# Patient Record
Sex: MALE | Race: BLACK OR AFRICAN AMERICAN | NOT HISPANIC OR LATINO | Employment: OTHER | URBAN - METROPOLITAN AREA
[De-identification: names, ages, dates, MRNs, and addresses within clinical notes are randomized per-mention and may not be internally consistent; named-entity substitution may affect disease eponyms.]

---

## 2023-04-14 ENCOUNTER — HOSPITAL ENCOUNTER (OUTPATIENT)
Dept: LAB | Facility: MEDICAL CENTER | Age: 73
End: 2023-04-14
Attending: UROLOGY
Payer: COMMERCIAL

## 2023-04-14 LAB — PSA SERPL-MCNC: 7.46 NG/ML (ref 0–4)

## 2023-04-14 PROCEDURE — 84153 ASSAY OF PSA TOTAL: CPT

## 2023-04-14 PROCEDURE — 36415 COLL VENOUS BLD VENIPUNCTURE: CPT

## 2025-02-21 ENCOUNTER — HOSPITAL ENCOUNTER (OUTPATIENT)
Dept: RADIOLOGY | Facility: MEDICAL CENTER | Age: 75
End: 2025-02-21
Attending: NURSE PRACTITIONER
Payer: COMMERCIAL

## 2025-02-21 DIAGNOSIS — E11.8 TYPE 2 DIABETES MELLITUS WITH UNSPECIFIED COMPLICATIONS (HCC): ICD-10-CM

## 2025-02-21 DIAGNOSIS — I10 ESSENTIAL (PRIMARY) HYPERTENSION: ICD-10-CM

## 2025-02-21 DIAGNOSIS — Z85.528 PERSONAL HISTORY OF OTHER MALIGNANT NEOPLASM OF KIDNEY: ICD-10-CM

## 2025-02-21 DIAGNOSIS — R97.20 ELEVATED PROSTATE SPECIFIC ANTIGEN (PSA): ICD-10-CM

## 2025-02-21 DIAGNOSIS — N18.4 CHRONIC KIDNEY DISEASE, STAGE 4 (SEVERE) (HCC): ICD-10-CM

## 2025-02-21 DIAGNOSIS — H54.10 BLINDNESS, ONE EYE, LOW VISION OTHER EYE, UNSPECIFIED EYES: ICD-10-CM

## 2025-02-21 DIAGNOSIS — I82.5Z2: ICD-10-CM

## 2025-02-21 DIAGNOSIS — R73.03 PREDIABETES: ICD-10-CM

## 2025-02-21 PROCEDURE — 700111 HCHG RX REV CODE 636 W/ 250 OVERRIDE (IP): Mod: JZ,TB | Performed by: RADIOLOGY

## 2025-02-21 PROCEDURE — 72195 MRI PELVIS W/O DYE: CPT

## 2025-02-21 RX ADMIN — GLUCAGON 1 MG: 1 INJECTION, POWDER, LYOPHILIZED, FOR SOLUTION INTRAMUSCULAR; INTRAVENOUS at 14:25

## 2025-04-14 ENCOUNTER — HOSPITAL ENCOUNTER (OUTPATIENT)
Dept: RADIOLOGY | Facility: MEDICAL CENTER | Age: 75
End: 2025-04-14
Attending: STUDENT IN AN ORGANIZED HEALTH CARE EDUCATION/TRAINING PROGRAM
Payer: COMMERCIAL

## 2025-04-14 DIAGNOSIS — Z85.528 PERSONAL HISTORY OF RENAL CANCER: ICD-10-CM

## 2025-04-14 LAB
GLUCOSE BLD-MCNC: 78 MG/DL (ref 65–99)
GLUCOSE BLD-MCNC: 85 MG/DL (ref 65–99)

## 2025-04-14 PROCEDURE — A9552 F18 FDG: HCPCS

## 2025-05-05 ENCOUNTER — PATIENT OUTREACH (OUTPATIENT)
Dept: ONCOLOGY | Facility: MEDICAL CENTER | Age: 75
End: 2025-05-05
Payer: COMMERCIAL

## 2025-05-08 ENCOUNTER — HOSPITAL ENCOUNTER (OUTPATIENT)
Dept: RADIOLOGY | Facility: MEDICAL CENTER | Age: 75
End: 2025-05-08
Attending: NURSE PRACTITIONER
Payer: COMMERCIAL

## 2025-05-08 ENCOUNTER — HOSPITAL ENCOUNTER (OUTPATIENT)
Dept: RADIOLOGY | Facility: MEDICAL CENTER | Age: 75
End: 2025-05-08
Payer: COMMERCIAL

## 2025-05-08 ENCOUNTER — PATIENT OUTREACH (OUTPATIENT)
Dept: ONCOLOGY | Facility: MEDICAL CENTER | Age: 75
End: 2025-05-08
Payer: COMMERCIAL

## 2025-05-08 NOTE — PROGRESS NOTES
First call placed to Sachin to introduce self and assess. No answer at time of call, LVM with reason for call and call back number. Sachin was referred to Dr. Altamirano by Dr. Gtz for prostate cancer.

## 2025-05-12 PROBLEM — C61 PROSTATE CANCER (HCC): Status: ACTIVE | Noted: 2025-05-12

## 2025-05-13 ENCOUNTER — HOSPITAL ENCOUNTER (OUTPATIENT)
Dept: RADIATION ONCOLOGY | Facility: MEDICAL CENTER | Age: 75
End: 2025-05-13
Attending: RADIOLOGY
Payer: COMMERCIAL

## 2025-05-13 VITALS
BODY MASS INDEX: 23.98 KG/M2 | WEIGHT: 177.03 LBS | HEIGHT: 72 IN | TEMPERATURE: 97.9 F | DIASTOLIC BLOOD PRESSURE: 83 MMHG | RESPIRATION RATE: 14 BRPM | SYSTOLIC BLOOD PRESSURE: 139 MMHG | HEART RATE: 62 BPM

## 2025-05-13 DIAGNOSIS — C61 PROSTATE CANCER (HCC): ICD-10-CM

## 2025-05-13 PROCEDURE — 99205 OFFICE O/P NEW HI 60 MIN: CPT | Performed by: RADIOLOGY

## 2025-05-13 PROCEDURE — 99214 OFFICE O/P EST MOD 30 MIN: CPT | Performed by: RADIOLOGY

## 2025-05-13 RX ORDER — CIPROFLOXACIN 500 MG/1
500 TABLET, FILM COATED ORAL 2 TIMES DAILY
COMMUNITY

## 2025-05-13 RX ORDER — VITAMIN B COMPLEX
1000 TABLET ORAL DAILY
COMMUNITY

## 2025-05-13 RX ORDER — AMLODIPINE BESYLATE 10 MG/1
10 TABLET ORAL DAILY
COMMUNITY

## 2025-05-13 RX ORDER — CETIRIZINE HYDROCHLORIDE 10 MG/1
10 TABLET ORAL DAILY
COMMUNITY

## 2025-05-13 ASSESSMENT — PAIN SCALES - GENERAL: PAINLEVEL_OUTOF10: 1=MINIMAL PAIN

## 2025-05-13 NOTE — PROGRESS NOTES
Patient was seen today in clinic with Dr. North for consult.  Vitals signs and weight were obtained and pain assessment was completed.  Allergies and medications were reviewed with the patient.       Vitals/Pain:  Vitals:    05/13/25 0850   BP: 139/83   BP Location: Right arm   Patient Position: Sitting   Pulse: 62   Resp: 14   Temp: 36.6 °C (97.9 °F)   Weight: 80.3 kg (177 lb 0.5 oz)   Height: 1.829 m (6')   Pain Score: 1=Minimal Pain        Allergies:   Patient has no known allergies.    Current Medications:  Current Outpatient Medications   Medication Sig Dispense Refill    amLODIPine (NORVASC) 10 MG Tab Take 10 mg by mouth every day.      vitamin D3 (CHOLECALCIFEROL) 1000 Unit (25 mcg) Tab Take 1,000 Units by mouth every day.      cetirizine (ZYRTEC) 10 MG Tab Take 10 mg by mouth every day.      ciprofloxacin (CIPRO) 500 MG Tab Take 500 mg by mouth 2 times a day.       No current facility-administered medications for this encounter.         PCP:  Pcp Pt States None        Tracie Lopez R.N.

## 2025-05-13 NOTE — CONSULTS
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      Bilateral renal cell carcinoma status post left nephrectomy in 2014 right partial nephrectomy in 2015, new right kidney mass by MRI and PET    HISTORY OF PRESENT ILLNESS: I had the pleasure of seeing Mr. Thayer today in consultation at the request of Dr. Gzt for his prostate cancer.  Patient is a 75-year-old gentleman whose history of genitourinary cancers dates back to 2014.  At that time he was diagnosed with a renal cell carcinoma and underwent a left-sided nephrectomy.  In 2015 he was identified to have a right sided renal cell carcinoma.  He underwent a right partial nephrectomy.  He has remained disease-free since that time until on surveillance this year MRI detected a new right kidney mass.  An FDG PET scan showed mild avidity in this area but could also be consistent with the collecting system.  There was also mild uptake in a 7 mm left upper lobe lung nodule of unknown certain significance.  In the interim he was also found to have an elevated PSA at 11.3.  He did not have any clinical nodularity.  Biopsy showed a [place] 4+4 = 8 disease in the left apex and Rani 4+3 = 7 disease in the left mid and left base.  He is uncertain how he wishes to proceed with his renal cell evaluation.  With his current comorbidities he is unwilling to undergo dialysis if this was required surgically.  He presents today to further discuss his options regarding his prostate gland.    PAST MEDICAL HISTORY:   Past Medical History:   Diagnosis Date    Blind     Blindness     Left eye, low vision right eye    Chronic kidney disease (CKD), stage IV (severe) (HCC)     DVT (deep venous thrombosis) (HCC)     RLE 2020    Elevated PSA     Esophagitis     Glaucoma     HTN (hypertension)     Hypertension     Renal cell carcinoma (HCC)     Right  and Left partial nephrectomy 2014, 2015    Type 2 diabetes mellitus (HCC)     Controlled by diet       PAST SURGICAL HISTORY:  Past Surgical History:   Procedure Laterality Date    CATARACT EXTRACTION WITH IOL Right     NEPHRECTOMY PARTIAL Left     12/2014    NEPHRECTOMY PARTIAL Right     01/2015    US TRANSRECTAL      TRUS Prostate Biopsy       CURRENT MEDICATIONS:  Current Outpatient Medications   Medication Sig Dispense Refill    amLODIPine (NORVASC) 10 MG Tab Take 10 mg by mouth every day.      vitamin D3 (CHOLECALCIFEROL) 1000 Unit (25 mcg) Tab Take 1,000 Units by mouth every day.      cetirizine (ZYRTEC) 10 MG Tab Take 10 mg by mouth every day.      ciprofloxacin (CIPRO) 500 MG Tab Take 500 mg by mouth 2 times a day.       No current facility-administered medications for this encounter.       ALLERGIES:    Patient has no known allergies.    FAMILY HISTORY:    Family History   Problem Relation Age of Onset    Cancer Father         prostate    Cancer Brother         prostate    Cancer Brother         prostate       SOCIAL HISTORY:    Social History     Tobacco Use    Smoking status: Never    Smokeless tobacco: Never   Vaping Use    Vaping status: Never Used   Substance Use Topics    Alcohol use: Not Currently     Comment: quit 1984    Drug use: Never       Patient is retired from [employer]  Lives with: Roommates, patient is legally blind    REVIEW OF SYSTEMS:  A complete review of systems was completed in patient's chart on 5/13/2025.  All are negative with relationship to this diagnosis with the exception of:  Patient is legally blind but does have right assistance, as a result of this and his comorbidities he is questioning how aggressive he wishes to be with any of his current malignant potential    PHYSICAL EXAM:    Vitals:    05/13/25 0850   BP: 139/83   BP Location: Right arm   Patient Position: Sitting   Pulse: 62   Resp: 14   Temp: 36.6 °C (97.9 °F)   Weight: 80.3 kg (177 lb 0.5 oz)   Height: 1.829  m (6')   Pain Score: 1=Minimal Pain      2= Ambulatory and capable of all self care, but unable to carry out any work activities.  Up and about more than 50% of waking hours.    PAIN:  0    GENERAL: No apparent distress.  HEENT:  Pupils are equal, round, and reactive to light.  Extraocular muscles   are intact. Sclerae nonicteric.  Conjunctivae pink.  Oral cavity, tongue   protrudes midline.   NECK:  Supple without evidence of thyromegaly.  NODES:  No peripheral adenopathy of the neck, supraclavicular fossa or axillae   bilaterally.  LUNGS:  Clear to ascultation bilaterally   HEART:  Regular rate and rhythm.  No murmur appreciated  ABDOMEN:  Soft. No evidence of hepatosplenomegaly.  Positive bowel sounds.  EXTREMITIES:  Without Edema.  NEUROLOGIC:  Cranial nerves II through XII were intact. Normal stance and gait motor and sensory grossly within normal limits       IPSS:  IN THE PAST MONTH:         TOTAL: 15 8-19 = Moderate    QUALITY OF LIFE DUE TO URINARY SYMPTOMS:     If you were to spend the rest of your life with your urinary condition just the way it is now, how would you feel about that? 2  = Mostly satisfied      DEVAN:  SEXUAL HEALTH INVENTORY FOR MEN (DEVAN):   Over the past 6 months:   1.  How do you rate your confidence that you could get and keep an erection?  1 = Very low    2.  When you had erections with sexual stimulation, how often were your erections hard enough for penetration (entering your partner)? 0 = No sexual activitity     3.  During sexual intercourse, how often were you able to maintain your erection after you had penetrated (entered) your partner? 0 = Did not attempt    4.  During sexual intercourse, how difficult was it to maintain your erection to completion of intercourse? 0= Did not attempt intercourse    5.  When you attempted sexual intercourse, how often was it satisfactory for you? 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      Bilateral renal cell carcinoma status post left nephrectomy in 2014 right partial nephrectomy in 2015, new right kidney mass by MRI and PET      RECOMMENDATIONS:   I discussed the diagnosis, prognosis, and treatment options over a 1 hr 5 min time period, 95% of that time dedicated to ongoing treatment management.  I first discussed with the patient his life goals.  If there are reasonable ways to maintain quality of life without dialysis he is willing to consider treatment options.  Therefore we discussed possibly a biopsy of the renal lesion on the right.  There are medical management for recurrent renal cell carcinoma.  This would likely minimize his risk of dialysis requirement.  As it relates to prostate cancer we discussed the high risk nature of his disease.  Therefore we discussed either management with androgen deprivation therapy alone or in combination with radiation.  For high risk disease even if this was his only site of disease we would start with neoadjuvant androgen deprivation therapy.  In his case this should allow us time to assess his renal cell situation either by biopsy or short interval radiographic follow-up.  Therefore I have suggested he start on androgen deprivation therapy at the [facility].  He and I will then revisit in 3 months to assess his progress on both fronts.  If he chooses to pursue radiation he felt 20 fraction hypofractionated radiation would be his choice as opposed to stereotactic radiosurgery.    We discussed the risks, benefits and side effects of treatment and the patient is amenable to treatment.  If patient has any questions or concerns, he should feel free to contact me.    Thank you for the opportunity to participate in his care.  If any questions or  comments, please do not hesitate in calling.

## 2025-05-15 ENCOUNTER — PATIENT OUTREACH (OUTPATIENT)
Dept: ONCOLOGY | Facility: MEDICAL CENTER | Age: 75
End: 2025-05-15
Payer: COMMERCIAL

## 2025-05-15 NOTE — PROGRESS NOTES
2nd call placed to Sachin today. No answer at time of call, [facility]. Sachin had consultation with Dr. Altamirano on 5/13 for prostate cancer. Sachin sent back to [facility] Dr. Gtz to start hormone therapy and then follow up visit scheduled with Dr. Altamirano 8/14/25.

## 2025-08-08 ENCOUNTER — PATIENT OUTREACH (OUTPATIENT)
Dept: ONCOLOGY | Facility: MEDICAL CENTER | Age: 75
End: 2025-08-08
Payer: COMMERCIAL

## 2025-08-14 ENCOUNTER — HOSPITAL ENCOUNTER (OUTPATIENT)
Dept: RADIATION ONCOLOGY | Facility: MEDICAL CENTER | Age: 75
End: 2025-08-14
Attending: RADIOLOGY
Payer: COMMERCIAL

## 2025-08-14 VITALS
DIASTOLIC BLOOD PRESSURE: 80 MMHG | WEIGHT: 177.69 LBS | HEART RATE: 67 BPM | BODY MASS INDEX: 24.1 KG/M2 | OXYGEN SATURATION: 97 % | SYSTOLIC BLOOD PRESSURE: 157 MMHG

## 2025-08-14 DIAGNOSIS — C61 PROSTATE CANCER (HCC): Primary | ICD-10-CM

## 2025-08-14 PROCEDURE — 99215 OFFICE O/P EST HI 40 MIN: CPT | Performed by: RADIOLOGY

## 2025-08-14 ASSESSMENT — PAIN SCALES - GENERAL: PAINLEVEL_OUTOF10: NO PAIN

## 2025-08-25 PROCEDURE — 99212 OFFICE O/P EST SF 10 MIN: CPT | Performed by: RADIOLOGY
